# Patient Record
Sex: FEMALE | Race: WHITE | Employment: OTHER | URBAN - METROPOLITAN AREA
[De-identification: names, ages, dates, MRNs, and addresses within clinical notes are randomized per-mention and may not be internally consistent; named-entity substitution may affect disease eponyms.]

---

## 2018-05-27 ENCOUNTER — HOSPITAL ENCOUNTER (EMERGENCY)
Facility: HOSPITAL | Age: 73
Discharge: HOME | End: 2018-05-27
Attending: EMERGENCY MEDICINE
Payer: MEDICARE

## 2018-05-27 VITALS
DIASTOLIC BLOOD PRESSURE: 77 MMHG | BODY MASS INDEX: 33.66 KG/M2 | HEART RATE: 98 BPM | WEIGHT: 190 LBS | SYSTOLIC BLOOD PRESSURE: 130 MMHG | TEMPERATURE: 98 F | RESPIRATION RATE: 21 BRPM | HEIGHT: 63 IN | OXYGEN SATURATION: 98 %

## 2018-05-27 DIAGNOSIS — T67.5XXA HEAT EXHAUSTION, INITIAL ENCOUNTER: Primary | ICD-10-CM

## 2018-05-27 DIAGNOSIS — D64.9 ANEMIA, UNSPECIFIED TYPE: ICD-10-CM

## 2018-05-27 PROCEDURE — 80053 COMPREHEN METABOLIC PANEL: CPT | Performed by: EMERGENCY MEDICINE

## 2018-05-27 PROCEDURE — 85025 COMPLETE CBC W/AUTO DIFF WBC: CPT | Performed by: EMERGENCY MEDICINE

## 2018-05-27 PROCEDURE — 96361 HYDRATE IV INFUSION ADD-ON: CPT

## 2018-05-27 PROCEDURE — 93005 ELECTROCARDIOGRAM TRACING: CPT

## 2018-05-27 PROCEDURE — 99284 EMERGENCY DEPT VISIT MOD MDM: CPT

## 2018-05-27 PROCEDURE — 93010 ELECTROCARDIOGRAM REPORT: CPT

## 2018-05-27 PROCEDURE — 99285 EMERGENCY DEPT VISIT HI MDM: CPT

## 2018-05-27 PROCEDURE — 96360 HYDRATION IV INFUSION INIT: CPT

## 2018-05-27 NOTE — ED PROVIDER NOTES
Patient Seen in: [facility] Emergency Department    History   Patient presents with:  Heat Exposure (metabolic)    Stated Complaint: heat exat.      HPI    82-year-old female presents to the emerge department feeling weak and lightheaded after being 33.66 kg/m²         Physical Exam   Constitutional: She is oriented to person, place, and time. She appears well-developed and well-nourished. HENT:   Head: Normocephalic and atraumatic.    Mouth/Throat: Oropharynx is clear and moist.   Eyes: EOM are norm -----------         ------                     CBC W/ DIFFERENTIAL[[id number]]          Abnormal            Final result                 Please view results for these tests on the individual orders.    PATH COMMENT CBC   RAINBOW DRAW BLUE   RAIN

## 2018-05-27 NOTE — ED INITIAL ASSESSMENT (HPI)
Pt is here due to being in the heat trying to get her car window closed. She feel when the care started to roll backwards.

## 2018-05-27 NOTE — CM/SW NOTE
Patient's Hg is a little bit lower than her normal.  Patient reports she is patient at [facility] and she is not allowed to have blood transfusions. This writer called her hospice at [phone].  Shania Grant who is on call made a call to her super

## (undated) NOTE — ED AVS SNAPSHOT
Shilpa Walsh   MRN: [id number]    Department:  [facility] Emergency Department   Date of Visit:  5/27/2018           Disclosure     Insurance plans vary and the physician(s) referred by the ER may not be covered by your plan.  Please contact you tell this physician (or your personal doctor if your instructions are to return to your personal doctor) about any new or lasting problems. The primary care or specialist physician will see patients referred from the [facility] Emergency Department.  Troy Montgomery